# Patient Record
Sex: FEMALE | Race: WHITE | NOT HISPANIC OR LATINO | Employment: FULL TIME | URBAN - METROPOLITAN AREA
[De-identification: names, ages, dates, MRNs, and addresses within clinical notes are randomized per-mention and may not be internally consistent; named-entity substitution may affect disease eponyms.]

---

## 2017-10-14 ENCOUNTER — OFFICE VISIT (OUTPATIENT)
Dept: URGENT CARE | Facility: CLINIC | Age: 32
End: 2017-10-14
Payer: COMMERCIAL

## 2017-10-14 VITALS
BODY MASS INDEX: 26.68 KG/M2 | TEMPERATURE: 98.3 F | DIASTOLIC BLOOD PRESSURE: 82 MMHG | SYSTOLIC BLOOD PRESSURE: 110 MMHG | HEART RATE: 96 BPM | WEIGHT: 145 LBS | RESPIRATION RATE: 14 BRPM | HEIGHT: 62 IN | OXYGEN SATURATION: 98 %

## 2017-10-14 DIAGNOSIS — A08.4 VIRAL GASTROENTERITIS: ICD-10-CM

## 2017-10-14 DIAGNOSIS — F41.8 ANXIETY ABOUT HEALTH: ICD-10-CM

## 2017-10-14 LAB
FLUAV+FLUBV AG SPEC QL IA: NEGATIVE
INT CON NEG: NEGATIVE
INT CON POS: POSITIVE

## 2017-10-14 PROCEDURE — 87804 INFLUENZA ASSAY W/OPTIC: CPT | Performed by: PHYSICIAN ASSISTANT

## 2017-10-14 PROCEDURE — 99213 OFFICE O/P EST LOW 20 MIN: CPT | Performed by: PHYSICIAN ASSISTANT

## 2017-10-14 ASSESSMENT — ENCOUNTER SYMPTOMS
CHILLS: 0
SENSORY CHANGE: 0
MYALGIAS: 0
NAUSEA: 1
COUGH: 0
FOCAL WEAKNESS: 1
TINGLING: 0
HEADACHES: 0
SORE THROAT: 0
BLOOD IN STOOL: 0
VOMITING: 0
SHORTNESS OF BREATH: 0
FEVER: 0
DIZZINESS: 0
ABDOMINAL PAIN: 1
PALPITATIONS: 0

## 2017-10-14 NOTE — PROGRESS NOTES
"Subjective:      Guillermina Bhatt is a 32 y.o. female who presents with Other (flu like symptoms)            Patient is here today because she developed diarrhea today. She also complains of nausea and stomach cramps. She is concerned because she was sweeping out the garage the past few days and states she was exposed to mouse droppings. She is worried she may have contracted the hantavirus. She denies fever, chills, vomiting, bloody stools, cough, or shortness of breath. She does report upper back and arm body aches. She denies sick contacts, recent hospitalization or recent antibiotic use.        Past Medical History:   Diagnosis Date   • HEMORRHOIDS    • Thyroid disease    • Urinary tract infection, site not specified      No past surgical history on file.    History reviewed. No pertinent family history.    Allergies   Allergen Reactions   • Sulfa Drugs        Medications, Allergies, and current problem list reviewed today in Epic      Review of Systems   Constitutional: Negative for chills, fever and malaise/fatigue.   HENT: Negative for sore throat.    Respiratory: Negative for cough and shortness of breath.    Cardiovascular: Negative for chest pain, palpitations and leg swelling.   Gastrointestinal: Positive for abdominal pain (generalized abdominal cramps) and nausea. Negative for blood in stool and vomiting.   Musculoskeletal: Negative for myalgias.   Skin: Negative for rash.   Neurological: Positive for focal weakness. Negative for dizziness, tingling, sensory change and headaches.       All other systems reviewed and are negative.      Objective:     /82   Pulse 96   Temp 36.8 °C (98.3 °F)   Resp 14   Ht 1.575 m (5' 2\")   Wt 65.8 kg (145 lb)   SpO2 98%   BMI 26.52 kg/m²      Physical Exam   Constitutional: She is oriented to person, place, and time. She appears well-developed and well-nourished. She appears distressed (patient is tearful on exam. she is moderately anxious).   HENT:   Head: " Normocephalic and atraumatic.   Eyes: Conjunctivae are normal.   Cardiovascular: Normal rate, regular rhythm and normal heart sounds.  Exam reveals no gallop and no friction rub.    No murmur heard.  Pulmonary/Chest: Effort normal and breath sounds normal. No respiratory distress. She has no wheezes. She has no rales.   Abdominal: Soft. Bowel sounds are normal. She exhibits no distension and no mass. There is tenderness (mild generalized TTP. No localized TTP.). There is no rebound and no guarding.   Neurological: She is alert and oriented to person, place, and time. No cranial nerve deficit.   Skin: Skin is warm and dry.   Psychiatric: She has a normal mood and affect. Her behavior is normal. Judgment and thought content normal.               Assessment/Plan:     1. Viral gastroenteritis    - POCT Influenza A/B- negative   - HANTAVIRUS ANTIBODIES HUGH; Future    Hantavirus unlikely. Reassured patient.   Encouraged conservative tx. AVS with information on Hantavirus given to patient. Will check above.    Red flags and warning signs discussed that warrant immediate follow-up.     Differential diagnoses, Supportive care, and indications for immediate follow-up discussed with patient.   Instructed to return to clinic or nearest emergency department for any change in condition, further concerns, or worsening of symptoms.    The patient demonstrated a good understanding and agreed with the treatment plan.    Brandee Calderon P.A.-C.

## 2017-10-14 NOTE — PATIENT INSTRUCTIONS
Hantavirus Infection  Hantaviruses infect many different types of rodents all over the world. The virus is passed in the rodent's urine, feces, and saliva. When people are infected with the virus, the early stages of this disease are similar to the flu. However, this disease may get worse quickly. It can lead to kidney failure, heart failure, or lung failure and can be life-threatening.  CAUSES   People become infected by ingesting or breathing in the virus in poorly ventilated places that are contaminated with rodent waste. This may occur while the person is living in or cleaning a rodent-infested house or shed, or while hiking and camping in rodent-infested areas.  SYMPTOMS   Symptoms typically begin 2 to 3 weeks after exposure to the virus. Early symptoms include:  · Chills.  · Fever.  · Muscle aches.  Some people recover without any further symptoms. In other cases, the disease may suddenly get worse. The following symptoms may then occur:  · Shortness of breath.  · Coughing.  · Headache.  · Nausea.  · Vomiting.  · Nosebleed.  · Bloody stools.  · Bloody urine.  · Low blood pressure.  DIAGNOSIS   Your caregiver will perform a physical exam. Other tests may be done, including:  · Blood tests.  · Kidney and liver function tests.  · Chest X-rays.  TREATMENT   Severe hantavirus infections require treatment in the hospital. In some cases, you may be taken to the intensive care unit (ICU). Treatment in the hospital includes:  · Giving oxygen through a breathing tube. In severe cases, a breathing machine may be used.  · Medicine to treat kidney problems.  · Kidney dialysis to remove waste from the blood.  PREVENTION   · Avoid areas with rodent nests or droppings.  · When camping, sleep on a ground cover and pad.  · If you must clean an area with rodent nests or droppings, take the following steps:  ¨ Open all the doors and windows and leave the room. Let the room air out for at least 30 minutes.  ¨ Spray rodent nests,  droppings, carpets, and other possibly infected surfaces with disinfectant or a 10% bleach solution. Allow the solution to sit for 30 minutes.  ¨ Seal any holes that might allow rodents into the building. You can use steel wool, wire screens, cement, or other patching material.  ¨ Wear disposable rubber gloves while cleaning. Place all waste materials in a sealed, plastic bag for disposal. Disinfect your gloves before removing them. Wash your hands thoroughly immediately after removing your gloves.  ¨ Wash all possibly contaminated surfaces with disinfectant or a 10% bleach solution.  ¨ Do not sweep, vacuum, or stir up dust until the room has been completely decontaminated.     This information is not intended to replace advice given to you by your health care provider. Make sure you discuss any questions you have with your health care provider.     Document Released: 12/08/2003 Document Revised: 03/11/2013 Document Reviewed: 11/19/2012  [employer] Interactive Patient Education ©2016 [employer]

## 2017-10-20 LAB
Lab: <2
Lab: <2

## 2017-10-26 ENCOUNTER — APPOINTMENT (RX ONLY)
Dept: URBAN - METROPOLITAN AREA CLINIC 4 | Facility: CLINIC | Age: 32
Setting detail: DERMATOLOGY
End: 2017-10-26

## 2017-10-26 DIAGNOSIS — L70.0 ACNE VULGARIS: ICD-10-CM

## 2017-10-26 PROCEDURE — ? COUNSELING

## 2017-10-26 PROCEDURE — ? PRESCRIPTION

## 2017-10-26 PROCEDURE — 99211 OFF/OP EST MAY X REQ PHY/QHP: CPT

## 2017-10-26 RX ORDER — BENZOYL PEROXIDE 60 MG/1
CLOTH TOPICAL
Qty: 1 | Refills: 12 | Status: ERX | COMMUNITY
Start: 2017-10-26

## 2017-10-26 RX ORDER — ADAPALENE 3 MG/G
GEL TOPICAL QHS
Qty: 1 | Refills: 3 | Status: ERX | COMMUNITY
Start: 2017-10-26

## 2017-10-26 RX ADMIN — BENZOYL PEROXIDE: 60 CLOTH TOPICAL at 19:46

## 2017-10-26 RX ADMIN — ADAPALENE: 3 GEL TOPICAL at 19:46

## 2017-10-26 ASSESSMENT — LOCATION SIMPLE DESCRIPTION DERM
LOCATION SIMPLE: RIGHT CHEEK
LOCATION SIMPLE: LEFT CHEEK
LOCATION SIMPLE: CHIN

## 2017-10-26 ASSESSMENT — LOCATION DETAILED DESCRIPTION DERM
LOCATION DETAILED: LEFT MENTAL CREASE
LOCATION DETAILED: RIGHT INFERIOR LATERAL MALAR CHEEK
LOCATION DETAILED: LEFT INFERIOR CENTRAL MALAR CHEEK

## 2017-10-26 ASSESSMENT — LOCATION ZONE DERM: LOCATION ZONE: FACE

## 2017-10-29 ENCOUNTER — TELEPHONE (OUTPATIENT)
Dept: URGENT CARE | Facility: CLINIC | Age: 32
End: 2017-10-29

## 2018-06-10 ENCOUNTER — OFFICE VISIT (OUTPATIENT)
Dept: URGENT CARE | Facility: PHYSICIAN GROUP | Age: 33
End: 2018-06-10
Payer: COMMERCIAL

## 2018-06-10 ENCOUNTER — HOSPITAL ENCOUNTER (OUTPATIENT)
Dept: RADIOLOGY | Facility: MEDICAL CENTER | Age: 33
End: 2018-06-10
Attending: NURSE PRACTITIONER
Payer: COMMERCIAL

## 2018-06-10 VITALS
WEIGHT: 140 LBS | RESPIRATION RATE: 15 BRPM | TEMPERATURE: 98.3 F | HEART RATE: 63 BPM | DIASTOLIC BLOOD PRESSURE: 62 MMHG | BODY MASS INDEX: 25.76 KG/M2 | SYSTOLIC BLOOD PRESSURE: 90 MMHG | OXYGEN SATURATION: 98 % | HEIGHT: 62 IN

## 2018-06-10 DIAGNOSIS — M94.0 ACUTE COSTOCHONDRITIS: ICD-10-CM

## 2018-06-10 DIAGNOSIS — R07.89 ANTERIOR CHEST WALL PAIN: ICD-10-CM

## 2018-06-10 PROCEDURE — 99213 OFFICE O/P EST LOW 20 MIN: CPT | Performed by: NURSE PRACTITIONER

## 2018-06-10 PROCEDURE — 71101 X-RAY EXAM UNILAT RIBS/CHEST: CPT | Mod: LT

## 2018-06-10 RX ORDER — OMEPRAZOLE 20 MG/1
CAPSULE, DELAYED RELEASE ORAL
Refills: 11 | COMMUNITY
Start: 2018-05-02 | End: 2018-07-31

## 2018-06-10 RX ORDER — VALACYCLOVIR HYDROCHLORIDE 1 G/1
TABLET, FILM COATED ORAL
Refills: 3 | COMMUNITY
Start: 2018-04-27

## 2018-06-10 ASSESSMENT — ENCOUNTER SYMPTOMS
NEUROLOGICAL NEGATIVE: 1
RESPIRATORY NEGATIVE: 1
CARDIOVASCULAR NEGATIVE: 1
PSYCHIATRIC NEGATIVE: 1
CONSTITUTIONAL NEGATIVE: 1
GASTROINTESTINAL NEGATIVE: 1

## 2018-06-10 ASSESSMENT — PAIN SCALES - GENERAL: PAINLEVEL: 4=SLIGHT-MODERATE PAIN

## 2018-06-10 NOTE — PROGRESS NOTES
"Subjective:      Guillermina Bhatt is a 32 y.o. female who presents with Mass (Lump on chest. Noticed it 1 -2 weeks. Starting to get painful)        HPI    The patient presents to urgent care today with complaints of chest wall pain. States she first noticed some pain and swelling to her left anterior chest wall approx 10 days ago. The symptoms have persisted since. She denies associated chest pain, shortness of breath, fever, chills, malaise, or abdominal pain. She denies any injury or trauma to the area. She has two small children which she does  and carry often. She is a stay-at-home mom. She has tried OTC NSAIDS with improvement in pain.     Past Medical History:   Diagnosis Date   • HEMORRHOIDS    • Thyroid disease    • Urinary tract infection, site not specified      History reviewed. No pertinent surgical history.  Current Outpatient Prescriptions on File Prior to Visit   Medication Sig Dispense Refill   • levothyroxine (SYNTHROID) 100 MCG TABS Take 75 mcg by mouth every morning.       No current facility-administered medications on file prior to visit.      ALL: Sulfa drugs    Review of Systems   Constitutional: Negative.    HENT: Negative.    Respiratory: Negative.    Cardiovascular: Negative.    Gastrointestinal: Negative.    Musculoskeletal:        Anterior chest wall pain and swelling   Skin: Negative.    Neurological: Negative.    Psychiatric/Behavioral: Negative.           Objective:     BP (!) 90/62   Pulse 63   Temp 36.8 °C (98.3 °F)   Resp 15   Ht 1.575 m (5' 2\")   Wt 63.5 kg (140 lb)   SpO2 98%   BMI 25.61 kg/m²      Physical Exam   Constitutional: She is oriented to person, place, and time. Vital signs are normal. She appears well-developed and well-nourished. She is active. She does not have a sickly appearance. She does not appear ill. No distress.   HENT:   Head: Normocephalic and atraumatic.   Right Ear: External ear normal.   Left Ear: External ear normal.   Nose: Nose normal. " "  Mouth/Throat: Oropharynx is clear and moist.   Eyes: Conjunctivae are normal. Pupils are equal, round, and reactive to light. Right eye exhibits no discharge. Left eye exhibits no discharge. No scleral icterus.   Neck: Normal range of motion. Neck supple. No JVD present.   Cardiovascular: Normal rate, regular rhythm, normal heart sounds and intact distal pulses.    Pulmonary/Chest: Effort normal and breath sounds normal. No stridor. No respiratory distress. She has no wheezes. She has no rales. She exhibits tenderness and swelling. She exhibits no mass, no laceration, no crepitus, no edema, no deformity and no retraction.       Musculoskeletal: Normal range of motion. She exhibits tenderness. She exhibits no edema or deformity.   Lymphadenopathy:     She has no cervical adenopathy.   Neurological: She is alert and oriented to person, place, and time. She has normal strength. No cranial nerve deficit or sensory deficit. She exhibits normal muscle tone. Coordination and gait normal. GCS eye subscore is 4. GCS verbal subscore is 5. GCS motor subscore is 6.   Skin: Skin is warm, dry and intact. Capillary refill takes less than 2 seconds. No abrasion, no bruising, no ecchymosis, no laceration and no rash noted. She is not diaphoretic. No erythema. No pallor.   Psychiatric: She has a normal mood and affect. Her behavior is normal. Judgment and thought content normal.   Vitals reviewed.              Assessment/Plan:     1. Acute costochondritis  [id number] (WITH 1-VIEW CXR) LEFT         DX rib reviewed by myself, radiology reading \"No acute cardiopulmonary process is seen. No displaced rib fracture is identified. Calcific densities projecting over the left kidney likely represent urolithiasis.\"        X-ray negative, suspect costochondritis. Calcific densities on x-ray discussed with patient. OTC NSAIDS, ice, reduce repetitive motions.   Supportive care, differential diagnoses, and indications for immediate " follow-up discussed with patient.   Pathogenesis of diagnosis discussed including typical length and natural progression.   Instructed to return to clinic or nearest emergency department for any change in condition, further concerns, or worsening of symptoms.  Patient states understanding of the plan of care and discharge instructions.  Instructed to make an appointment, for follow up, with her primary care provider.        BABAR Cisse.

## 2018-07-31 ENCOUNTER — OFFICE VISIT (OUTPATIENT)
Dept: URGENT CARE | Facility: PHYSICIAN GROUP | Age: 33
End: 2018-07-31
Payer: COMMERCIAL

## 2018-07-31 VITALS
RESPIRATION RATE: 16 BRPM | OXYGEN SATURATION: 100 % | HEART RATE: 83 BPM | WEIGHT: 140 LBS | SYSTOLIC BLOOD PRESSURE: 100 MMHG | HEIGHT: 62 IN | BODY MASS INDEX: 25.76 KG/M2 | TEMPERATURE: 99.1 F | DIASTOLIC BLOOD PRESSURE: 64 MMHG

## 2018-07-31 DIAGNOSIS — N64.4 BREAST PAIN, RIGHT: ICD-10-CM

## 2018-07-31 DIAGNOSIS — N64.52 NIPPLE DISCHARGE: ICD-10-CM

## 2018-07-31 PROCEDURE — 99213 OFFICE O/P EST LOW 20 MIN: CPT | Performed by: PHYSICIAN ASSISTANT

## 2018-07-31 ASSESSMENT — ENCOUNTER SYMPTOMS
CARDIOVASCULAR NEGATIVE: 1
RESPIRATORY NEGATIVE: 1
NEUROLOGICAL NEGATIVE: 1
NECK PAIN: 0
PSYCHIATRIC NEGATIVE: 1
VOMITING: 0
MYALGIAS: 0
CONSTITUTIONAL NEGATIVE: 1
NAUSEA: 0

## 2018-08-01 NOTE — PROGRESS NOTES
Subjective:      Guillermina Bhatt is a 32 y.o. female who presents with Breast Discharge (right breast itching and discharg, dimpling  x 1 month)        HPI  1 month of right breast discomfort.  Started with nipple itching and then turned into discomfort of the breast itself.  She looked at the nipple yesterday and saw some clear drainage.   She states she looked in the mirror and felt her breasts were asymmetrical and felt there was dimpling of the skin at the bottom of the breast.   Patient does note that on June 25th she had a positive pregnancy test.  A few weeks later she suspects she began miscarrying.  She has hx of miscarriages and states this felt very similar and has continued to feel this way.  Still having some bleeding but is lessened and denies any abdominal pain.  Took a pregnancy test a few days ago and the previously dark positive line was very faint.   No nausea or vomiting.  Denies family history of breast disease or breast cancer.  Tried to make appts with GYN and her PCP but they were a few weeks out .       Review of Systems   Constitutional: Negative.    Respiratory: Negative.    Cardiovascular: Negative.    Gastrointestinal: Negative for nausea and vomiting.   Musculoskeletal: Negative for myalgias and neck pain.   Skin: Negative.    Neurological: Negative.    Endo/Heme/Allergies: Negative.    Psychiatric/Behavioral: Negative.        PMH:  has a past medical history of HEMORRHOIDS; Thyroid disease; and Urinary tract infection, site not specified.  MEDS:   Current Outpatient Prescriptions:   •  levothyroxine (SYNTHROID) 100 MCG TABS, Take 75 mcg by mouth every morning., Disp: , Rfl:   •  valacyclovir (VALTREX) 1 GM Tab, TK 2 TS PO Q 12 H FOR 2 DOSES, Disp: , Rfl: 3  •  omeprazole (PRILOSEC) 20 MG delayed-release capsule, TK ONE C PO  ONCE D, Disp: , Rfl: 11  ALLERGIES:   Allergies   Allergen Reactions   • Sulfa Drugs      SURGHX: History reviewed. No pertinent surgical history.  SOCHX:  reports  "that she has never smoked. She has never used smokeless tobacco. She reports that she does not drink alcohol or use drugs.  FH: Family history was reviewed, no pertinent findings to report   Objective:     /64   Pulse 83   Temp 37.3 °C (99.1 °F)   Resp 16   Ht 1.575 m (5' 2\")   Wt 63.5 kg (140 lb)   SpO2 100%   BMI 25.61 kg/m²      Physical Exam   Constitutional: She is oriented to person, place, and time. She appears well-developed and well-nourished. No distress.   HENT:   Head: Normocephalic and atraumatic.   Eyes: Conjunctivae and EOM are normal.   Neck: Normal range of motion. Neck supple.   Cardiovascular: Normal rate and regular rhythm.    Pulmonary/Chest: Effort normal and breath sounds normal. Right breast exhibits tenderness (mild ). Right breast exhibits no inverted nipple, no nipple discharge and no skin change. Left breast exhibits no mass, no nipple discharge, no skin change and no tenderness. There is no breast swelling.       Genitourinary: No breast discharge or bleeding.   Lymphadenopathy:     She has no axillary adenopathy.        Right axillary: No pectoral and no lateral adenopathy present.        Left axillary: No pectoral and no lateral adenopathy present.  Neurological: She is alert and oriented to person, place, and time.   Skin: Skin is warm and dry.   Psychiatric: She has a normal mood and affect. Her behavior is normal.   Vitals reviewed.          Assessment/Plan:     1. Breast pain, right  US-BREAST COMPLETE-RIGHT   2. Nipple discharge  MA-MAMMO DIAGNOSTIC UNILAT W/PRAMOD W/CAD RIGHT         -diagnostic imaging of the right breast ordered.    -discussed with patient MA will order tomorrow and may be a few days until able to get in as this is not a STAT imaging test.   -continue to monitor for changes, increase or decrease in symptoms or new concerning symptoms  -patient will keep her OB appt in 2 weeks and advise she make her PCP appt for end of month, the soonest they could " get her in.       Supportive care, differential diagnoses, and indications for immediate follow-up discussed with patient.   Pathogenesis of diagnosis discussed including typical length and natural progression.   Instructed to return to clinic or nearest emergency department for any change in condition, further concerns, or worsening of symptoms.  Patient states understanding of the plan of care and discharge instructions.  Instructed to make an appointment, for follow up, with primary care provider.        Brandee Solis P.A.-C.

## 2018-08-02 ENCOUNTER — TELEPHONE (OUTPATIENT)
Dept: URGENT CARE | Facility: PHYSICIAN GROUP | Age: 33
End: 2018-08-02

## 2018-08-02 NOTE — TELEPHONE ENCOUNTER
Patient called in wondering how much longer it would be before she was contacted to schedule a mammogram. Pt states that you gave her a referral and told her she should have something by the end of the week

## 2018-08-07 ENCOUNTER — HOSPITAL ENCOUNTER (OUTPATIENT)
Dept: RADIOLOGY | Facility: MEDICAL CENTER | Age: 33
End: 2018-08-07
Attending: PHYSICIAN ASSISTANT
Payer: COMMERCIAL

## 2018-08-07 DIAGNOSIS — N64.4 BREAST PAIN, RIGHT: ICD-10-CM

## 2018-08-07 DIAGNOSIS — N64.52 NIPPLE DISCHARGE: ICD-10-CM

## 2018-08-07 PROCEDURE — 76642 ULTRASOUND BREAST LIMITED: CPT | Mod: RT

## 2018-08-07 PROCEDURE — G0279 TOMOSYNTHESIS, MAMMO: HCPCS

## 2018-08-24 ENCOUNTER — OFFICE VISIT (OUTPATIENT)
Dept: URGENT CARE | Facility: PHYSICIAN GROUP | Age: 33
End: 2018-08-24
Payer: COMMERCIAL

## 2018-08-24 VITALS
WEIGHT: 141 LBS | SYSTOLIC BLOOD PRESSURE: 116 MMHG | RESPIRATION RATE: 18 BRPM | BODY MASS INDEX: 25.95 KG/M2 | TEMPERATURE: 96.8 F | OXYGEN SATURATION: 96 % | DIASTOLIC BLOOD PRESSURE: 72 MMHG | HEART RATE: 69 BPM | HEIGHT: 62 IN

## 2018-08-24 DIAGNOSIS — J01.00 ACUTE MAXILLARY SINUSITIS, RECURRENCE NOT SPECIFIED: ICD-10-CM

## 2018-08-24 DIAGNOSIS — R05.9 COUGH: ICD-10-CM

## 2018-08-24 PROCEDURE — 99214 OFFICE O/P EST MOD 30 MIN: CPT | Performed by: NURSE PRACTITIONER

## 2018-08-24 RX ORDER — AMOXICILLIN AND CLAVULANATE POTASSIUM 875; 125 MG/1; MG/1
1 TABLET, FILM COATED ORAL 2 TIMES DAILY
Qty: 20 TAB | Refills: 0 | Status: SHIPPED | OUTPATIENT
Start: 2018-08-24 | End: 2018-09-03

## 2018-08-24 ASSESSMENT — ENCOUNTER SYMPTOMS
FEVER: 0
HEADACHES: 1
COUGH: 1
CHILLS: 0
SORE THROAT: 1
RHINORRHEA: 1
SPUTUM PRODUCTION: 1
SINUS PAIN: 1

## 2018-08-24 NOTE — PROGRESS NOTES
"Subjective:      Guillermina Bhatt is a 32 y.o. female who presents with Cough (pain behind eye, sore throats X 10 days )    Past Medical History:   Diagnosis Date   • HEMORRHOIDS    • Thyroid disease    • Urinary tract infection, site not specified      Social History     Social History   • Marital status:      Spouse name: N/A   • Number of children: N/A   • Years of education: N/A     Occupational History   • Not on file.     Social History Main Topics   • Smoking status: Never Smoker   • Smokeless tobacco: Never Used   • Alcohol use No      Comment: Social prior pregnancy   • Drug use: No   • Sexual activity: Yes     Partners: Male     Other Topics Concern   • Not on file     Social History Narrative   • No narrative on file     History reviewed. No pertinent family history.    Allergies: Sulfa drugs    Patient is a 32-year-old female who presents with complaint of sinus pain over the maxillary sinuses with yellow postnasal drainage and cough. Symptoms started 2 weeks ago.          Cough   This is a new problem. The current episode started 1 to 4 weeks ago. The problem has been gradually worsening. The problem occurs every few minutes. The cough is productive of sputum. Associated symptoms include headaches, rhinorrhea and a sore throat. Pertinent negatives include no chills or fever. Nothing aggravates the symptoms. She has tried nothing for the symptoms. The treatment provided no relief.       Review of Systems   Constitutional: Positive for malaise/fatigue. Negative for chills and fever.   HENT: Positive for congestion, rhinorrhea, sinus pain and sore throat.    Respiratory: Positive for cough and sputum production.    Skin: Negative.    Neurological: Positive for headaches.   All other systems reviewed and are negative.         Objective:     /72   Pulse 69   Temp 36 °C (96.8 °F)   Resp 18   Ht 1.575 m (5' 2\")   Wt 64 kg (141 lb)   SpO2 96%   BMI 25.79 kg/m²      Physical Exam "   Constitutional: She is oriented to person, place, and time. She appears well-developed and well-nourished.   HENT:   Head: Normocephalic.   Right Ear: External ear normal.   Left Ear: External ear normal.   Nose: Nose normal.   Mouth/Throat: No oropharyngeal exudate.   Tender over the maxillary sinuses  Yellow PND   Eyes: Pupils are equal, round, and reactive to light. Conjunctivae and EOM are normal.   Neck: Normal range of motion. Neck supple.   Cardiovascular: Normal rate.    Pulmonary/Chest: Effort normal and breath sounds normal.   Musculoskeletal: Normal range of motion.   Neurological: She is alert and oriented to person, place, and time. No cranial nerve deficit.   Skin: Skin is warm and dry. Capillary refill takes less than 2 seconds.   Psychiatric: She has a normal mood and affect. Her behavior is normal.   Vitals reviewed.              Assessment/Plan:     1. Cough  2. Sinusitis  -Augmentin  -humidifier  -follow up for persistent or worsening of symptoms .

## 2019-04-20 ENCOUNTER — OFFICE VISIT (OUTPATIENT)
Dept: URGENT CARE | Facility: PHYSICIAN GROUP | Age: 34
End: 2019-04-20
Payer: COMMERCIAL

## 2019-04-20 VITALS
DIASTOLIC BLOOD PRESSURE: 84 MMHG | HEIGHT: 62 IN | OXYGEN SATURATION: 96 % | BODY MASS INDEX: 25.76 KG/M2 | SYSTOLIC BLOOD PRESSURE: 122 MMHG | WEIGHT: 140 LBS | TEMPERATURE: 98.8 F | HEART RATE: 77 BPM

## 2019-04-20 DIAGNOSIS — R09.82 POST-NASAL DRAINAGE: ICD-10-CM

## 2019-04-20 DIAGNOSIS — L30.9 DERMATITIS: ICD-10-CM

## 2019-04-20 DIAGNOSIS — J02.9 SORE THROAT: ICD-10-CM

## 2019-04-20 LAB
INT CON NEG: NEGATIVE
INT CON POS: POSITIVE
S PYO AG THROAT QL: NEGATIVE

## 2019-04-20 PROCEDURE — 99214 OFFICE O/P EST MOD 30 MIN: CPT | Performed by: PHYSICIAN ASSISTANT

## 2019-04-20 PROCEDURE — 87880 STREP A ASSAY W/OPTIC: CPT | Performed by: PHYSICIAN ASSISTANT

## 2019-04-20 RX ORDER — LIOTHYRONINE SODIUM 5 UG/1
TABLET ORAL
Refills: 11 | COMMUNITY
Start: 2019-03-21

## 2019-04-20 ASSESSMENT — ENCOUNTER SYMPTOMS
SWOLLEN GLANDS: 0
TROUBLE SWALLOWING: 0
DIZZINESS: 0
HEADACHES: 0
SORE THROAT: 1
COUGH: 1
SPUTUM PRODUCTION: 1
FEVER: 0
SHORTNESS OF BREATH: 0
EYE DISCHARGE: 0
DIARRHEA: 0
WHEEZING: 0
EYE REDNESS: 0
TINGLING: 0
NECK PAIN: 0
MYALGIAS: 0
CHILLS: 0
VOMITING: 0

## 2019-04-20 NOTE — PROGRESS NOTES
"Subjective:      Guillermina Bhatt is a 33 y.o. female who presents with Sore Throat (White spot, painful to swallow and yawn, x1.5 week )            Pharyngitis    This is a new problem. The current episode started 1 to 4 weeks ago. The pain is worse on the right side. There has been no fever. The pain is mild. Associated symptoms include congestion, coughing and a plugged ear sensation. Pertinent negatives include no diarrhea, ear discharge, ear pain, headaches, neck pain, shortness of breath, swollen glands, trouble swallowing or vomiting. Associated symptoms comments: Reports recent flare of her eczema. . She has had no exposure to strep or mono. Treatments tried: OTc creams, and salt water gargles.  The treatment provided mild relief.       Review of Systems   Constitutional: Positive for malaise/fatigue. Negative for chills and fever.   HENT: Positive for congestion and sore throat. Negative for ear discharge, ear pain and trouble swallowing.    Eyes: Negative for discharge and redness.   Respiratory: Positive for cough and sputum production. Negative for shortness of breath and wheezing.    Cardiovascular: Negative for chest pain.   Gastrointestinal: Negative for diarrhea and vomiting.   Genitourinary: Negative for dysuria.   Musculoskeletal: Negative for myalgias and neck pain.   Skin: Positive for itching and rash.   Neurological: Negative for dizziness, tingling and headaches.   All other systems reviewed and are negative.         Objective:     /84 (BP Location: Left arm, Patient Position: Sitting, BP Cuff Size: Adult)   Pulse 77   Temp 37.1 °C (98.8 °F) (Temporal)   Ht 1.575 m (5' 2\")   Wt 63.5 kg (140 lb)   SpO2 96%   BMI 25.61 kg/m²    PMH:  has a past medical history of HEMORRHOIDS; Thyroid disease; and Urinary tract infection, site not specified.  MEDS:   Current Outpatient Prescriptions:   •  liothyronine (CYTOMEL) 5 MCG Tab, TK 1 T PO QD IN ADDITION TO SYNTHROID, Disp: , Rfl: 11  •  " levothyroxine (SYNTHROID) 100 MCG TABS, Take 75 mcg by mouth every morning., Disp: , Rfl:   •  valacyclovir (VALTREX) 1 GM Tab, TK 2 TS PO Q 12 H FOR 2 DOSES, Disp: , Rfl: 3  ALLERGIES:   Allergies   Allergen Reactions   • Sulfa Drugs      SURGHX: No past surgical history on file.  SOCHX:  reports that she has never smoked. She has never used smokeless tobacco. She reports that she does not drink alcohol or use drugs.  FH: Family history was reviewed, no pertinent findings to report    Physical Exam   Constitutional: She is oriented to person, place, and time. She appears well-developed and well-nourished.   HENT:   Head: Normocephalic and atraumatic.   Mouth/Throat: No oropharyngeal exudate.   Ears- Canals clear- TM- with clear fluid effusions bilaterally.   Pos. PND, with slight erythema- without tonsillar edema or exudate.   Mild discharge noted bilaterally- to nares.      Eyes: Pupils are equal, round, and reactive to light. EOM are normal.   Neck: Normal range of motion. Neck supple.   Cardiovascular: Normal rate and regular rhythm.    No murmur heard.  Pulmonary/Chest: Effort normal and breath sounds normal. No respiratory distress.   Musculoskeletal: Normal range of motion. She exhibits no tenderness.   Lymphadenopathy:     She has no cervical adenopathy.   Neurological: She is alert and oriented to person, place, and time.   Skin: Skin is warm. Rash noted.        Faint scaling rash to upper eyelids.    Psychiatric: She has a normal mood and affect. Her behavior is normal.   Vitals reviewed.              Assessment/Plan:     1. Sore throat  - POCT Rapid Strep A    2. Post-nasal drainage  3. Dermatitis    It was explained to the pt. Today that due to the viral nature of the pt's illness, we will treat symptomatically today. Encouraged OTC supportive meds PRN. Encouraged OTC Cerave along with antihistamine as well.    Humidification, increase fluids, avoid night time dairy.   Patient given precautionary s/sx  that mandate immediate follow up and evaluation in the ED. Advised of risks of not doing so.    DDX, Supportive care, and indications for immediate follow-up discussed with patient.    Instructed to return to clinic or nearest emergency department if we are not available for any change in condition, further concerns, or worsening of symptoms.    The patient demonstrated a good understanding and agreed with the treatment plan.

## 2020-06-16 ENCOUNTER — APPOINTMENT (RX ONLY)
Dept: URBAN - METROPOLITAN AREA CLINIC 282 | Facility: CLINIC | Age: 35
Setting detail: DERMATOLOGY
End: 2020-06-16

## 2020-06-16 DIAGNOSIS — L72.0 EPIDERMAL CYST: ICD-10-CM

## 2020-06-16 PROBLEM — C44.319 BASAL CELL CARCINOMA OF SKIN OF OTHER PARTS OF FACE: Status: ACTIVE | Noted: 2020-06-16

## 2020-06-16 PROCEDURE — ? COUNSELING

## 2020-06-16 PROCEDURE — ? BIOPSY BY SHAVE METHOD

## 2020-06-16 PROCEDURE — ? ADDITIONAL NOTES

## 2020-06-16 PROCEDURE — ? COSMETIC EXTRACTIONS

## 2020-06-16 PROCEDURE — 11102 TANGNTL BX SKIN SINGLE LES: CPT

## 2020-06-16 ASSESSMENT — LOCATION DETAILED DESCRIPTION DERM: LOCATION DETAILED: LEFT MEDIAL CANTHUS

## 2020-06-16 ASSESSMENT — LOCATION ZONE DERM: LOCATION ZONE: EYELID

## 2020-06-16 ASSESSMENT — LOCATION SIMPLE DESCRIPTION DERM: LOCATION SIMPLE: LEFT EYELID

## 2020-06-16 NOTE — PROCEDURE: BIOPSY BY SHAVE METHOD
Detail Level: Detailed
Depth Of Biopsy: dermis
Was A Bandage Applied: Yes
Size Of Lesion In Cm: 0.4
X Size Of Lesion In Cm: 0
Biopsy Type: H and E
Biopsy Method: Dermablade
Anesthesia Type: 1% lidocaine with epinephrine
Anesthesia Volume In Cc (Will Not Render If 0): 0.5
Hemostasis: Drysol
Wound Care: Petrolatum
Dressing: bandage
Destruction After The Procedure: No
Type Of Destruction Used: Curettage
Curettage Text: The wound bed was treated with curettage after the biopsy was performed.
Cryotherapy Text: The wound bed was treated with cryotherapy after the biopsy was performed.
Electrodesiccation Text: The wound bed was treated with electrodesiccation after the biopsy was performed.
Electrodesiccation And Curettage Text: The wound bed was treated with electrodesiccation and curettage after the biopsy was performed.
Silver Nitrate Text: The wound bed was treated with silver nitrate after the biopsy was performed.
Consent: Written consent was obtained and risks were reviewed including but not limited to scarring, infection, bleeding, scabbing, incomplete removal, nerve damage and allergy to anesthesia.
Post-Care Instructions: I reviewed with the patient in detail post-care instructions. Patient is to keep the biopsy site dry overnight, and then apply bacitracin twice daily until healed. Patient may apply hydrogen peroxide soaks to remove any crusting.
Notification Instructions: Patient will be notified of biopsy results. However, patient instructed to call the office if not contacted within 2 weeks.
Billing Type: Third-Party Bill
Information: Selecting Yes will display possible errors in your note based on the variables you have selected. This validation is only offered as a suggestion for you. PLEASE NOTE THAT THE VALIDATION TEXT WILL BE REMOVED WHEN YOU FINALIZE YOUR NOTE. IF YOU WANT TO FAX A PRELIMINARY NOTE YOU WILL NEED TO TOGGLE THIS TO 'NO' IF YOU DO NOT WANT IT IN YOUR FAXED NOTE.

## 2021-07-22 ENCOUNTER — APPOINTMENT (RX ONLY)
Dept: URBAN - METROPOLITAN AREA CLINIC 282 | Facility: CLINIC | Age: 36
Setting detail: DERMATOLOGY
End: 2021-07-22

## 2021-07-22 DIAGNOSIS — L72.0 EPIDERMAL CYST: ICD-10-CM

## 2021-07-22 DIAGNOSIS — L81.8 OTHER SPECIFIED DISORDERS OF PIGMENTATION: ICD-10-CM

## 2021-07-22 PROCEDURE — 99213 OFFICE O/P EST LOW 20 MIN: CPT

## 2021-07-22 PROCEDURE — ? COUNSELING

## 2021-07-22 PROCEDURE — ? TREATMENT REGIMEN

## 2021-07-22 ASSESSMENT — LOCATION DETAILED DESCRIPTION DERM
LOCATION DETAILED: LEFT MEDIAL BREAST 6-7:00 REGION
LOCATION DETAILED: LEFT MEDIAL BREAST 7-8:00 REGION

## 2021-07-22 ASSESSMENT — LOCATION ZONE DERM: LOCATION ZONE: TRUNK

## 2021-07-22 ASSESSMENT — LOCATION SIMPLE DESCRIPTION DERM: LOCATION SIMPLE: LEFT BREAST

## 2021-07-22 NOTE — PROCEDURE: TREATMENT REGIMEN
Detail Level: Simple
Plan: D/w pt may apply retinol to area but might it may be drying/irritating.\\nD/w pt exfoliation \\nIf those do not work, option to have spot removed in office. Pt decided to try noninvasive options first.
Plan: Pt instructed to rtc if spot recurs-she expressed understanding\\nAlso d/w pt mammogram to r/o any underlying breast cancer-she expressed understanding;  no signs of concern on the skin surface

## 2023-11-20 ENCOUNTER — OFFICE VISIT (OUTPATIENT)
Dept: URGENT CARE | Facility: PHYSICIAN GROUP | Age: 38
End: 2023-11-20
Payer: COMMERCIAL

## 2023-11-20 VITALS
RESPIRATION RATE: 18 BRPM | SYSTOLIC BLOOD PRESSURE: 118 MMHG | TEMPERATURE: 97.1 F | HEART RATE: 68 BPM | HEIGHT: 62 IN | BODY MASS INDEX: 25.76 KG/M2 | DIASTOLIC BLOOD PRESSURE: 64 MMHG | OXYGEN SATURATION: 98 % | WEIGHT: 140 LBS

## 2023-11-20 DIAGNOSIS — R50.9 FEVER, UNSPECIFIED FEVER CAUSE: ICD-10-CM

## 2023-11-20 DIAGNOSIS — N39.0 URINARY TRACT INFECTION WITH HEMATURIA, SITE UNSPECIFIED: Primary | ICD-10-CM

## 2023-11-20 DIAGNOSIS — R31.9 URINARY TRACT INFECTION WITH HEMATURIA, SITE UNSPECIFIED: Primary | ICD-10-CM

## 2023-11-20 DIAGNOSIS — R11.0 NAUSEA: ICD-10-CM

## 2023-11-20 LAB
APPEARANCE UR: NORMAL
BILIRUB UR STRIP-MCNC: NEGATIVE MG/DL
COLOR UR AUTO: YELLOW
GLUCOSE UR STRIP.AUTO-MCNC: NEGATIVE MG/DL
KETONES UR STRIP.AUTO-MCNC: NEGATIVE MG/DL
LEUKOCYTE ESTERASE UR QL STRIP.AUTO: NORMAL
NITRITE UR QL STRIP.AUTO: POSITIVE
PH UR STRIP.AUTO: 6 [PH] (ref 5–8)
POCT INT CON NEG: NEGATIVE
POCT INT CON POS: POSITIVE
POCT URINE PREGNANCY TEST: NEGATIVE
PROT UR QL STRIP: 30 MG/DL
RBC UR QL AUTO: NORMAL
SP GR UR STRIP.AUTO: 1.02
UROBILINOGEN UR STRIP-MCNC: 0.2 MG/DL

## 2023-11-20 PROCEDURE — 81002 URINALYSIS NONAUTO W/O SCOPE: CPT | Performed by: PHYSICIAN ASSISTANT

## 2023-11-20 PROCEDURE — 99203 OFFICE O/P NEW LOW 30 MIN: CPT | Performed by: PHYSICIAN ASSISTANT

## 2023-11-20 PROCEDURE — 81025 URINE PREGNANCY TEST: CPT | Performed by: PHYSICIAN ASSISTANT

## 2023-11-20 PROCEDURE — 3074F SYST BP LT 130 MM HG: CPT | Performed by: PHYSICIAN ASSISTANT

## 2023-11-20 PROCEDURE — 3078F DIAST BP <80 MM HG: CPT | Performed by: PHYSICIAN ASSISTANT

## 2023-11-20 RX ORDER — PHENAZOPYRIDINE HYDROCHLORIDE 200 MG/1
200 TABLET, FILM COATED ORAL 3 TIMES DAILY PRN
Qty: 6 TABLET | Refills: 0 | Status: SHIPPED | OUTPATIENT
Start: 2023-11-20

## 2023-11-20 RX ORDER — CEFDINIR 300 MG/1
300 CAPSULE ORAL 2 TIMES DAILY
Qty: 14 CAPSULE | Refills: 0 | Status: SHIPPED | OUTPATIENT
Start: 2023-11-20 | End: 2023-11-27

## 2023-11-20 RX ORDER — ONDANSETRON 4 MG/1
4 TABLET, ORALLY DISINTEGRATING ORAL EVERY 6 HOURS PRN
Qty: 10 TABLET | Refills: 0 | Status: SHIPPED | OUTPATIENT
Start: 2023-11-20

## 2023-11-21 ASSESSMENT — ENCOUNTER SYMPTOMS
FEVER: 0
FLANK PAIN: 1

## 2023-11-21 NOTE — PROGRESS NOTES
"Subjective     Guillermina Bhatt is a 38 y.o. female who presents with Other (Hx of kidney stones or uti, burning pain, Lft kidney poss has a stones , want able to hold down water for 4 days)            Patient presents with dysuria, urgency and frequency..  Pt has history of kidney stones and uti, sometimes have similar onset symptoms. PT recently  wasn't able to hold down water for 4 days due to GI bug the whole family had which is now resolved.  PT states she really thinks she has UTI .  No other complaint.       UTI  This is a new problem. The current episode started in the past 7 days. The problem occurs constantly. The problem has been gradually worsening. Associated symptoms include urinary symptoms. Pertinent negatives include no fever. Nothing aggravates the symptoms. She has tried drinking for the symptoms.       Review of Systems   Constitutional:  Negative for fever.   Genitourinary:  Positive for dysuria, flank pain, frequency and urgency. Negative for hematuria.   All other systems reviewed and are negative.             Objective     /64   Pulse 68   Temp 36.2 °C (97.1 °F) (Temporal)   Resp 18   Ht 1.575 m (5' 2\")   Wt 63.5 kg (140 lb)   SpO2 98%   BMI 25.61 kg/m²      Physical Exam  Vitals and nursing note reviewed.   Constitutional:       General: She is not in acute distress.     Appearance: Normal appearance. She is well-developed. She is not toxic-appearing.   HENT:      Head: Normocephalic and atraumatic.      Nose: Nose normal.      Mouth/Throat:      Mouth: Mucous membranes are moist.   Eyes:      Extraocular Movements: Extraocular movements intact.      Conjunctiva/sclera: Conjunctivae normal.      Pupils: Pupils are equal, round, and reactive to light.   Cardiovascular:      Rate and Rhythm: Normal rate and regular rhythm.      Pulses: Normal pulses.      Heart sounds: Normal heart sounds.   Pulmonary:      Effort: Pulmonary effort is normal.      Breath sounds: Normal " breath sounds.   Abdominal:      General: Bowel sounds are normal.      Palpations: Abdomen is soft.      Tenderness: There is no guarding or rebound.   Musculoskeletal:         General: Normal range of motion.      Cervical back: Normal range of motion and neck supple.   Skin:     General: Skin is warm and dry.      Capillary Refill: Capillary refill takes less than 2 seconds.   Neurological:      General: No focal deficit present.      Mental Status: She is alert and oriented to person, place, and time.      Gait: Gait normal.   Psychiatric:         Mood and Affect: Mood normal.         Behavior: Behavior is cooperative.                             Assessment & Plan        1. Urinary tract infection with hematuria, site unspecified    - cefdinir (OMNICEF) 300 MG Cap; Take 1 Capsule by mouth 2 times a day for 7 days.  Dispense: 14 Capsule; Refill: 0  - phenazopyridine (PYRIDIUM) 200 MG Tab; Take 1 Tablet by mouth 3 times a day as needed for Moderate Pain.  Dispense: 6 Tablet; Refill: 0  - ondansetron (ZOFRAN ODT) 4 MG TABLET DISPERSIBLE; Take 1 Tablet by mouth every 6 hours as needed for Nausea/Vomiting.  Dispense: 10 Tablet; Refill: 0  - POCT Urinalysis  - POCT Pregnancy  - URINE CULTURE(NEW); Future    2. Fever, unspecified fever cause    - cefdinir (OMNICEF) 300 MG Cap; Take 1 Capsule by mouth 2 times a day for 7 days.  Dispense: 14 Capsule; Refill: 0  - phenazopyridine (PYRIDIUM) 200 MG Tab; Take 1 Tablet by mouth 3 times a day as needed for Moderate Pain.  Dispense: 6 Tablet; Refill: 0  - ondansetron (ZOFRAN ODT) 4 MG TABLET DISPERSIBLE; Take 1 Tablet by mouth every 6 hours as needed for Nausea/Vomiting.  Dispense: 10 Tablet; Refill: 0  - POCT Urinalysis  - POCT Pregnancy  - URINE CULTURE(NEW); Future    3. Nausea    - cefdinir (OMNICEF) 300 MG Cap; Take 1 Capsule by mouth 2 times a day for 7 days.  Dispense: 14 Capsule; Refill: 0  - phenazopyridine (PYRIDIUM) 200 MG Tab; Take 1 Tablet by mouth 3 times a day  as needed for Moderate Pain.  Dispense: 6 Tablet; Refill: 0  - ondansetron (ZOFRAN ODT) 4 MG TABLET DISPERSIBLE; Take 1 Tablet by mouth every 6 hours as needed for Nausea/Vomiting.  Dispense: 10 Tablet; Refill: 0  - POCT Urinalysis  - POCT Pregnancy  - URINE CULTURE(NEW); Future              UA: positive nitrites, LE, blood consistent with UTI. I will treat with cefdinir, states works the best for her UTI. PT to begin today.     I have also sent Rx for zofran for patient for nausea.     Differential diagnosis, supportive care, and indications for immediate follow-up discussed with patient.  Instructed to return to clinic or nearest emergency department for any change in condition, further concerns, or worsening of symptoms.    I personally reviewed prior external notes and test results pertinent to today's visit.  I have independently reviewed and interpreted all diagnostics ordered during this urgent care visit.    PT should follow up with PCP in 1-2 days for re-evaluation if symptoms have not improved.      Discussed red flags and reasons to return to UC or ED.      Pt and/or family verbalized understanding of diagnosis and follow up instructions and was offered informational handout on diagnosis.  PT discharged.     Please note that this dictation was created using voice recognition software. I have made every reasonable attempt to correct obvious errors, but I expect that there may be errors of grammar and possibly content that I did not discover before finalizing the note.